# Patient Record
Sex: MALE | URBAN - METROPOLITAN AREA
[De-identification: names, ages, dates, MRNs, and addresses within clinical notes are randomized per-mention and may not be internally consistent; named-entity substitution may affect disease eponyms.]

---

## 2020-03-20 ENCOUNTER — APPOINTMENT (RX ONLY)
Dept: URBAN - METROPOLITAN AREA CLINIC 112 | Facility: CLINIC | Age: 25
Setting detail: DERMATOLOGY
End: 2020-03-20

## 2020-03-20 DIAGNOSIS — L72.0 EPIDERMAL CYST: ICD-10-CM

## 2020-03-20 PROCEDURE — 99202 OFFICE O/P NEW SF 15 MIN: CPT

## 2020-03-20 PROCEDURE — ? PRESCRIPTION

## 2020-03-20 PROCEDURE — ? COUNSELING

## 2020-03-20 PROCEDURE — ? TREATMENT REGIMEN

## 2020-03-20 PROCEDURE — ? ORDER TESTS

## 2020-03-20 ASSESSMENT — LOCATION DETAILED DESCRIPTION DERM: LOCATION DETAILED: MID-VENTRAL PENILE SHAFT

## 2020-03-20 ASSESSMENT — LOCATION ZONE DERM: LOCATION ZONE: PENIS

## 2020-03-20 ASSESSMENT — LOCATION SIMPLE DESCRIPTION DERM: LOCATION SIMPLE: VENTRAL PENIS

## 2020-03-20 NOTE — PROCEDURE: TREATMENT REGIMEN
Ileana La Roche-Posay Products: No
Other Instructions: Culture collected to rule out secondary infection.  Doxycycline to treat empirically.
Detail Level: Zone
Action 4: Continue
Start Regimen: Doxycycline 100mg - take 1 capsule PO twice daily for food and water x 10 days

## 2020-03-20 NOTE — HPI: SKIN LESION
Is This A New Presentation, Or A Follow-Up?: Skin Lesion
Additional History: Patient popped the lesion about 2-3 weeks ago and the lesion drained for about 1 week after he popped it. He recalls a clear fluid draining from the lesion. He notes that it is only mildly painful when he walks but denies any other tenderness or burning.

## 2020-03-20 NOTE — PROCEDURE: ORDER TESTS
Billing Type: Third-Party Bill
Expected Date Of Service: 03/20/2020
Performing Laboratory: [id number]
Bill For Surgical Tray: no